# Patient Record
Sex: MALE | Race: WHITE | NOT HISPANIC OR LATINO | Employment: UNEMPLOYED | URBAN - METROPOLITAN AREA
[De-identification: names, ages, dates, MRNs, and addresses within clinical notes are randomized per-mention and may not be internally consistent; named-entity substitution may affect disease eponyms.]

---

## 2017-12-20 ENCOUNTER — HOSPITAL ENCOUNTER (OUTPATIENT)
Dept: OTHER | Facility: HOSPITAL | Age: 37
Setting detail: SPECIMEN
Discharge: HOME | End: 2017-12-20
Attending: SURGERY | Admitting: SURGERY

## 2022-06-02 ENCOUNTER — HOSPITAL ENCOUNTER (EMERGENCY)
Facility: HOSPITAL | Age: 42
Discharge: LEFT BEFORE DISCHARGE | End: 2022-06-02

## 2022-06-02 ENCOUNTER — APPOINTMENT (OUTPATIENT)
Dept: GENERAL RADIOLOGY | Facility: HOSPITAL | Age: 42
End: 2022-06-02

## 2022-06-02 VITALS
TEMPERATURE: 97.6 F | SYSTOLIC BLOOD PRESSURE: 154 MMHG | OXYGEN SATURATION: 99 % | BODY MASS INDEX: 25.67 KG/M2 | DIASTOLIC BLOOD PRESSURE: 115 MMHG | HEART RATE: 84 BPM | RESPIRATION RATE: 19 BRPM | WEIGHT: 200 LBS | HEIGHT: 74 IN

## 2022-06-02 LAB
ALBUMIN SERPL-MCNC: 4.4 G/DL (ref 3.5–5.2)
ALBUMIN/GLOB SERPL: 1.6 G/DL
ALP SERPL-CCNC: 62 U/L (ref 39–117)
ALT SERPL W P-5'-P-CCNC: 15 U/L (ref 1–41)
ANION GAP SERPL CALCULATED.3IONS-SCNC: 12 MMOL/L (ref 5–15)
AST SERPL-CCNC: 28 U/L (ref 1–40)
BASOPHILS # BLD AUTO: 0.09 10*3/MM3 (ref 0–0.2)
BASOPHILS NFR BLD AUTO: 0.9 % (ref 0–1.5)
BILIRUB SERPL-MCNC: 0.3 MG/DL (ref 0–1.2)
BUN SERPL-MCNC: 9 MG/DL (ref 6–20)
BUN/CREAT SERPL: 8.8 (ref 7–25)
CALCIUM SPEC-SCNC: 9.5 MG/DL (ref 8.6–10.5)
CHLORIDE SERPL-SCNC: 102 MMOL/L (ref 98–107)
CO2 SERPL-SCNC: 26 MMOL/L (ref 22–29)
CREAT SERPL-MCNC: 1.02 MG/DL (ref 0.76–1.27)
DEPRECATED RDW RBC AUTO: 42.5 FL (ref 37–54)
EGFRCR SERPLBLD CKD-EPI 2021: 94.1 ML/MIN/1.73
EOSINOPHIL # BLD AUTO: 0.33 10*3/MM3 (ref 0–0.4)
EOSINOPHIL NFR BLD AUTO: 3.2 % (ref 0.3–6.2)
ERYTHROCYTE [DISTWIDTH] IN BLOOD BY AUTOMATED COUNT: 13.4 % (ref 12.3–15.4)
GLOBULIN UR ELPH-MCNC: 2.7 GM/DL
GLUCOSE SERPL-MCNC: 100 MG/DL (ref 65–99)
HCT VFR BLD AUTO: 48.5 % (ref 37.5–51)
HGB BLD-MCNC: 16.1 G/DL (ref 13–17.7)
HOLD SPECIMEN: NORMAL
IMM GRANULOCYTES # BLD AUTO: 0.04 10*3/MM3 (ref 0–0.05)
IMM GRANULOCYTES NFR BLD AUTO: 0.4 % (ref 0–0.5)
LIPASE SERPL-CCNC: 39 U/L (ref 13–60)
LYMPHOCYTES # BLD AUTO: 3.99 10*3/MM3 (ref 0.7–3.1)
LYMPHOCYTES NFR BLD AUTO: 39.2 % (ref 19.6–45.3)
MCH RBC QN AUTO: 28.8 PG (ref 26.6–33)
MCHC RBC AUTO-ENTMCNC: 33.2 G/DL (ref 31.5–35.7)
MCV RBC AUTO: 86.8 FL (ref 79–97)
MONOCYTES # BLD AUTO: 0.67 10*3/MM3 (ref 0.1–0.9)
MONOCYTES NFR BLD AUTO: 6.6 % (ref 5–12)
NEUTROPHILS NFR BLD AUTO: 49.7 % (ref 42.7–76)
NEUTROPHILS NFR BLD AUTO: 5.06 10*3/MM3 (ref 1.7–7)
NRBC BLD AUTO-RTO: 0 /100 WBC (ref 0–0.2)
NT-PROBNP SERPL-MCNC: 24.4 PG/ML (ref 0–450)
PLATELET # BLD AUTO: 245 10*3/MM3 (ref 140–450)
PMV BLD AUTO: 10.5 FL (ref 6–12)
POTASSIUM SERPL-SCNC: 4 MMOL/L (ref 3.5–5.2)
PROT SERPL-MCNC: 7.1 G/DL (ref 6–8.5)
RBC # BLD AUTO: 5.59 10*6/MM3 (ref 4.14–5.8)
SODIUM SERPL-SCNC: 140 MMOL/L (ref 136–145)
TROPONIN T SERPL-MCNC: <0.01 NG/ML (ref 0–0.03)
WBC NRBC COR # BLD: 10.18 10*3/MM3 (ref 3.4–10.8)
WHOLE BLOOD HOLD COAG: NORMAL
WHOLE BLOOD HOLD SPECIMEN: NORMAL

## 2022-06-02 PROCEDURE — 84484 ASSAY OF TROPONIN QUANT: CPT

## 2022-06-02 PROCEDURE — 80053 COMPREHEN METABOLIC PANEL: CPT

## 2022-06-02 PROCEDURE — 83690 ASSAY OF LIPASE: CPT

## 2022-06-02 PROCEDURE — 83880 ASSAY OF NATRIURETIC PEPTIDE: CPT

## 2022-06-02 PROCEDURE — 99211 OFF/OP EST MAY X REQ PHY/QHP: CPT

## 2022-06-02 PROCEDURE — 93005 ELECTROCARDIOGRAM TRACING: CPT

## 2022-06-02 PROCEDURE — 85025 COMPLETE CBC W/AUTO DIFF WBC: CPT

## 2022-06-02 PROCEDURE — 71045 X-RAY EXAM CHEST 1 VIEW: CPT

## 2022-06-02 RX ORDER — ASPIRIN 81 MG/1
324 TABLET, CHEWABLE ORAL ONCE
Status: DISCONTINUED | OUTPATIENT
Start: 2022-06-02 | End: 2022-06-02 | Stop reason: HOSPADM

## 2022-06-02 RX ORDER — SODIUM CHLORIDE 0.9 % (FLUSH) 0.9 %
10 SYRINGE (ML) INJECTION AS NEEDED
Status: DISCONTINUED | OUTPATIENT
Start: 2022-06-02 | End: 2022-06-02 | Stop reason: HOSPADM

## 2023-10-02 PROCEDURE — 99283 EMERGENCY DEPT VISIT LOW MDM: CPT

## 2023-10-03 ENCOUNTER — HOSPITAL ENCOUNTER (EMERGENCY)
Facility: HOSPITAL | Age: 43
Discharge: HOME | End: 2023-10-03
Attending: EMERGENCY MEDICINE | Admitting: EMERGENCY MEDICINE
Payer: COMMERCIAL

## 2023-10-03 VITALS
DIASTOLIC BLOOD PRESSURE: 77 MMHG | TEMPERATURE: 97.9 F | HEART RATE: 70 BPM | RESPIRATION RATE: 18 BRPM | HEIGHT: 74 IN | SYSTOLIC BLOOD PRESSURE: 109 MMHG | BODY MASS INDEX: 28.23 KG/M2 | OXYGEN SATURATION: 94 % | WEIGHT: 220 LBS

## 2023-10-03 DIAGNOSIS — Z76.89 ENCOUNTER FOR INCISION AND DRAINAGE PROCEDURE: ICD-10-CM

## 2023-10-03 DIAGNOSIS — L02.31 ABSCESS OF RIGHT BUTTOCK: Primary | ICD-10-CM

## 2023-10-03 PROCEDURE — 87205 SMEAR GRAM STAIN: CPT | Performed by: EMERGENCY MEDICINE

## 2023-10-03 PROCEDURE — 87070 CULTURE OTHR SPECIMN AEROBIC: CPT | Performed by: EMERGENCY MEDICINE

## 2023-10-03 PROCEDURE — 87147 CULTURE TYPE IMMUNOLOGIC: CPT | Performed by: EMERGENCY MEDICINE

## 2023-10-03 PROCEDURE — 87186 SC STD MICRODIL/AGAR DIL: CPT | Performed by: EMERGENCY MEDICINE

## 2023-10-03 RX ORDER — LIDOCAINE HYDROCHLORIDE 10 MG/ML
10 INJECTION, SOLUTION EPIDURAL; INFILTRATION; INTRACAUDAL; PERINEURAL ONCE
Status: COMPLETED | OUTPATIENT
Start: 2023-10-03 | End: 2023-10-03

## 2023-10-03 RX ORDER — OXYCODONE HYDROCHLORIDE AND ACETAMINOPHEN 5; 325 MG/1; MG/1
1 TABLET ORAL EVERY 4 HOURS PRN
Qty: 10 TABLET | Refills: 0 | Status: SHIPPED | OUTPATIENT
Start: 2023-10-03

## 2023-10-03 RX ORDER — SULFAMETHOXAZOLE AND TRIMETHOPRIM 800; 160 MG/1; MG/1
1 TABLET ORAL 2 TIMES DAILY
Qty: 14 TABLET | Refills: 0 | Status: SHIPPED | OUTPATIENT
Start: 2023-10-03

## 2023-10-03 RX ORDER — OXYCODONE HYDROCHLORIDE AND ACETAMINOPHEN 5; 325 MG/1; MG/1
1 TABLET ORAL ONCE
Status: COMPLETED | OUTPATIENT
Start: 2023-10-03 | End: 2023-10-03

## 2023-10-03 RX ORDER — SULFAMETHOXAZOLE AND TRIMETHOPRIM 800; 160 MG/1; MG/1
1 TABLET ORAL ONCE
Status: COMPLETED | OUTPATIENT
Start: 2023-10-03 | End: 2023-10-03

## 2023-10-03 RX ORDER — CEPHALEXIN 500 MG/1
500 CAPSULE ORAL 2 TIMES DAILY
Qty: 14 CAPSULE | Refills: 0 | Status: SHIPPED | OUTPATIENT
Start: 2023-10-03

## 2023-10-03 RX ORDER — ALBUTEROL SULFATE 90 UG/1
2 AEROSOL, METERED RESPIRATORY (INHALATION) EVERY 4 HOURS PRN
COMMUNITY

## 2023-10-03 RX ORDER — CEPHALEXIN 250 MG/1
500 CAPSULE ORAL ONCE
Status: COMPLETED | OUTPATIENT
Start: 2023-10-03 | End: 2023-10-03

## 2023-10-03 RX ADMIN — OXYCODONE HYDROCHLORIDE AND ACETAMINOPHEN 1 TABLET: 5; 325 TABLET ORAL at 03:54

## 2023-10-03 RX ADMIN — CEPHALEXIN 500 MG: 250 CAPSULE ORAL at 03:54

## 2023-10-03 RX ADMIN — SULFAMETHOXAZOLE AND TRIMETHOPRIM 1 TABLET: 800; 160 TABLET ORAL at 03:54

## 2023-10-03 RX ADMIN — LIDOCAINE HYDROCHLORIDE 10 ML: 10 INJECTION, SOLUTION EPIDURAL; INFILTRATION; INTRACAUDAL; PERINEURAL at 03:55

## 2023-10-03 NOTE — Clinical Note
[facility] EMERGENCY DEPARTMENT  [address]  [facility] [address]  Phone: [phone]    aBlbir Soares was seen and treated in our emergency department on 10/2/2023.  He may return to work on 10/05/2023.         Thank you for choosing [facility].    Franny Ibrahim RN

## 2023-10-03 NOTE — Clinical Note
[facility] EMERGENCY DEPARTMENT  [address]  [facility] [address]  Phone: [phone]    Balbir Soares was seen and treated in our emergency department on 10/2/2023.  He may return to school on 10/03/2023.          Thank you for choosing [facility].    Franny Ibrahim RN

## 2023-10-03 NOTE — Clinical Note
[facility] EMERGENCY DEPARTMENT  [address]  [facility] [address]  Phone: [phone]    Balbir Soares was seen and treated in our emergency department on 10/2/2023.  He may return to work on 10/05/2023.         Thank you for choosing [facility].    Franny Ibrahim RN

## 2023-10-03 NOTE — DISCHARGE INSTRUCTIONS
Patient had progressive worsening of abscess to the right buttocks.  We reperformed incision and drainage and were able to expel large amount of purulent drainage.  Aerobic wound culture is in process.  We applied significant amount of packing and marked borders of redness.  Return to the ER for wound recheck and repacking in 48 hours.  Rx for Bactrim and Keflex twice daily x7 days and we wrote short course of Percocet 5 mg by mouth every 4-6 hours as needed for moderate pain dispense 10 no refills.  Patient may also alternate ibuprofen every 6 hours as needed for breakthrough pain.

## 2023-10-03 NOTE — ED PROVIDER NOTES
Subjective   History of Present Illness  This is a 43-year-old male that presents the ER with abscess to the right buttocks that has been progressively worsening x2 weeks.  Patient reports throbbing with increased pressure.  He said that redness surrounding the abscess has enlarged.  Patient was seen at [facility] on Thursday, 9/28/2023 and he had incision and drainage with packing placed.  The packing fell out quickly after he got home.  He was prescribed 1 antibiotic that he has been taking twice daily.  He denies known history of MRSA.  He denies personal history of diabetes.  He denies history of previous abscess.  No skin injury and patient denies drug use.  Patient reports chills but denies any fever or nausea/vomiting or malaise/fatigue or body aches.  No other concerns at this time.    History provided by:  Patient  Abscess  Location:  Pelvis  Pelvic abscess location:  R buttock  Size:  3cm  Abscess quality: induration, painful and redness    Red streaking: no    Duration:  2 weeks  Progression:  Worsening  Pain details:     Quality:  Throbbing and pressure    Timing:  Constant    Progression:  Worsening  Chronicity:  New  Context: not diabetes, not injected drug use and not skin injury    Relieved by:  Nothing  Worsened by:  Nothing  Ineffective treatments: I&D at [facility] 4 days ago and oral abx per [facility].  Pt has been on abx x 4 days.  Associated symptoms: no anorexia, no fatigue, no fever, no headaches, no nausea and no vomiting    Risk factors: no hx of MRSA and no prior abscess      Review of Systems   Constitutional: Negative.  Positive for chills. Negative for activity change, appetite change, fatigue and fever.   Respiratory: Negative.     Cardiovascular: Negative.    Gastrointestinal: Negative.  Negative for anorexia, nausea and vomiting.   Genitourinary: Negative.    Musculoskeletal: Negative.    Skin:  Positive for color change (erythema around abscess to right buttocks.) and wound (abscess to right  buttocks.).   Neurological: Negative.  Negative for dizziness and headaches.   All other systems reviewed and are negative.    Past Medical History:   Diagnosis Date    Asthma        Allergies   Allergen Reactions    Asa [Aspirin] Hives    Codeine Hives    Penicillins Hives       Past Surgical History:   Procedure Laterality Date    TUMOR EXCISION         History reviewed. No pertinent family history.    Social History     Socioeconomic History    Marital status:            Objective   Physical Exam  Vitals and nursing note reviewed.   Constitutional:       Appearance: Normal appearance.   HENT:      Head: Normocephalic and atraumatic.      Nose: Nose normal.      Mouth/Throat:      Mouth: Mucous membranes are moist.      Pharynx: Oropharynx is clear.   Eyes:      Extraocular Movements: Extraocular movements intact.      Conjunctiva/sclera: Conjunctivae normal.      Pupils: Pupils are equal, round, and reactive to light.   Cardiovascular:      Rate and Rhythm: Normal rate and regular rhythm.      Pulses: Normal pulses.      Heart sounds: Normal heart sounds.   Pulmonary:      Effort: Pulmonary effort is normal.      Breath sounds: Normal breath sounds.   Abdominal:      General: Bowel sounds are normal.      Palpations: Abdomen is soft.   Musculoskeletal:         General: Normal range of motion.      Cervical back: Normal range of motion and neck supple.   Skin:     General: Skin is warm and dry.      Findings: Erythema and wound present.             Comments: Central abscess to the right buttocks with purulent yellow drainage noted in the opening and surrounding 3 cm area of induration with at least 5 to 6 cm of confluent erythema to the buttocks.   Neurological:      General: No focal deficit present.      Mental Status: He is alert.       Incision & Drainage    Date/Time: 10/3/2023 3:53 AM  Performed by: Lori Gonzalez PA-C  Authorized by: Gonzalo Espinal MD     Consent:     Consent obtained:  Verbal     "Consent given by:  Patient    Risks, benefits, and alternatives were discussed: yes      Risks discussed:  Bleeding, incomplete drainage and infection  Universal protocol:     Patient identity confirmed:  Verbally with patient  Location:     Type:  Abscess    Size:  3cm    Location: Right buttocks.  Pre-procedure details:     Skin preparation:  Povidone-iodine  Sedation:     Sedation type:  None  Anesthesia:     Anesthesia method:  Local infiltration    Local anesthetic:  Lidocaine 1% w/o epi  Procedure type:     Complexity:  Complex  Procedure details:     Incision types:  Single straight    Incision depth:  Subcutaneous    Wound management:  Probed and deloculated    Drainage:  Purulent (There was also chunky, cheesy material as well as purulent yellow drainage)    Drainage amount:  Copious    Wound treatment:  Drain placed    Packing materials:  1/4 in gauze    Amount 1/4\":  6  Post-procedure details:     Procedure completion:  Tolerated well, no immediate complications           ED Course  ED Course as of 10/03/23 0359   Tue Oct 03, 2023   0354 Patient is afebrile and all other vital signs are stable.  He has abscess with central opening and recent I&D 4 days ago.  He is having progression of confluent erythema as well as 3 cm area of induration.  See procedure note for I&D details.  We were able to express a copious amount of purulent yellow drainage and aerobic wound cultures were obtained.  Patient also had some chunky cheesy core material expelled.  We applied approximately 6 inches of quarter inch sterile packing and then I applied Covaderm.  We will prescribe Bactrim and Keflex twice daily x7 days and we will give short course of Percocet 5 mg by mouth every 4-6 hours as needed for moderate pain dispense 10 no refills.  We marked the borders of erythema and recommend patient to return to the ER in 48 hours for wound recheck and repacking. [FC]      ED Course User Index  [FC] Lori Gonzalez PA-C         "   No results found for this or any previous visit (from the past 24 hour(s)).  Note: In addition to lab results from this visit, the labs listed above may include labs taken at another facility or during a different encounter within the last 24 hours. Please correlate lab times with ED admission and discharge times for further clarification of the services performed during this visit.    No orders to display     Vitals:    10/03/23 0316 10/03/23 0334 10/03/23 0344 10/03/23 0349   BP:       BP Location:       Patient Position:       Pulse: 72 71 64 70   Resp:       Temp:       TempSrc:       SpO2: 96% 96% 94% 94%   Weight:       Height:         Medications   lidocaine PF 1% (XYLOCAINE) injection 10 mL (10 mL Infiltration Given 10/3/23 0355)   oxyCODONE-acetaminophen (PERCOCET) 5-325 MG per tablet 1 tablet (1 tablet Oral Given 10/3/23 0354)   sulfamethoxazole-trimethoprim (BACTRIM DS,SEPTRA DS) 800-160 MG per tablet 1 tablet (1 tablet Oral Given 10/3/23 0354)   cephalexin (KEFLEX) capsule 500 mg (500 mg Oral Given 10/3/23 0354)     ECG/EMG Results (last 24 hours)       ** No results found for the last 24 hours. **          No orders to display     IWONA query complete. Treatment plan to include limited course of prescribed  controlled substance. Risks including addiction, benefits, and alternatives presented to patient.                                Medical Decision Making  Problems Addressed:  Abscess of right buttock: complicated acute illness or injury  Encounter for incision and drainage procedure: complicated acute illness or injury    Amount and/or Complexity of Data Reviewed  Labs: ordered.    Risk  Prescription drug management.        Final diagnoses:   Abscess of right buttock   Encounter for incision and drainage procedure       ED Disposition  ED Disposition       ED Disposition   Discharge    Condition   Stable    Comment   --               [facility] EMERGENCY DEPARTMENT  [address]  [facility] [id number] [phone]  In 2 days  Return to ER in 2 days for wound recheck and repacking         Medication List        New Prescriptions      cephalexin 500 MG capsule  Commonly known as: KEFLEX  Take 1 capsule by mouth 2 (Two) Times a Day.     oxyCODONE-acetaminophen 5-325 MG per tablet  Commonly known as: PERCOCET  Take 1 tablet by mouth Every 4 (Four) Hours As Needed for Moderate Pain.     sulfamethoxazole-trimethoprim 800-160 MG per tablet  Commonly known as: BACTRIM DS,SEPTRA DS  Take 1 tablet by mouth 2 (Two) Times a Day.               Where to Get Your Medications        These medications were sent to [id number] - [place], [place] - [address] AT [facility] OF [address] - [phone]  - [phone] FX  [address] [facility] [address]      Phone: [phone]   cephalexin 500 MG capsule  oxyCODONE-acetaminophen 5-325 MG per tablet  sulfamethoxazole-trimethoprim 800-160 MG per tablet            Lori Gonzalez PA-C  10/03/23 0357       Lori Gonzalez PA-C  10/03/23 9050

## 2023-10-04 ENCOUNTER — TELEPHONE (OUTPATIENT)
Dept: EMERGENCY DEPT | Facility: HOSPITAL | Age: 43
End: 2023-10-04
Payer: COMMERCIAL

## 2023-10-05 ENCOUNTER — HOSPITAL ENCOUNTER (EMERGENCY)
Facility: HOSPITAL | Age: 43
Discharge: HOME | End: 2023-10-05
Attending: EMERGENCY MEDICINE
Payer: COMMERCIAL

## 2023-10-05 VITALS
BODY MASS INDEX: 28.23 KG/M2 | HEART RATE: 83 BPM | DIASTOLIC BLOOD PRESSURE: 93 MMHG | OXYGEN SATURATION: 96 % | TEMPERATURE: 98 F | RESPIRATION RATE: 16 BRPM | HEIGHT: 74 IN | SYSTOLIC BLOOD PRESSURE: 126 MMHG | WEIGHT: 220 LBS

## 2023-10-05 DIAGNOSIS — Z48.00 ENCOUNTER FOR REMOVAL OF ABSCESS PACKING: Primary | ICD-10-CM

## 2023-10-05 LAB
BACTERIA SPEC AEROBE CULT: ABNORMAL
GRAM STN SPEC: ABNORMAL

## 2023-10-05 PROCEDURE — 99283 EMERGENCY DEPT VISIT LOW MDM: CPT

## 2023-10-05 RX ORDER — IBUPROFEN 800 MG/1
800 TABLET ORAL
Qty: 15 TABLET | Refills: 0 | Status: SHIPPED | OUTPATIENT
Start: 2023-10-05

## 2023-10-05 NOTE — ED PROVIDER NOTES
EMERGENCY DEPARTMENT ENCOUNTER    Pt Name: Balbir Soares  MRN: [id number]  Pt :   1980  Room Number:  26SF/26  Date of encounter:  10/5/2023  PCP: Joy Wade MD  ED Provider: GRIFFIN Conrad    Historian: Patient    HPI:  Chief Complaint: Right buttock abscess wound check    Context: Balbir Soares is a 43 y.o. male who presents to the ED c/o right buttock wound check.  Patient had a abscess I&D 2 days ago.  Patient is here for packing removal.  Patient is currently taking Bactrim and Keflex.  Patient reports that the area has continued to drain however it is improving.  Denies any fevers or chills.  HPI     REVIEW OF SYSTEMS  A chief complaint appropriate review of systems was completed and is negative except as noted in the HPI.     PAST MEDICAL HISTORY  Past Medical History:   Diagnosis Date    Asthma        PAST SURGICAL HISTORY  Past Surgical History:   Procedure Laterality Date    TUMOR EXCISION         FAMILY HISTORY  No family history on file.    SOCIAL HISTORY  Social History     Socioeconomic History    Marital status:        ALLERGIES  Asa [aspirin], Codeine, and Penicillins    PHYSICAL EXAM  Physical Exam  Vitals and nursing note reviewed.   Constitutional:       General: He is not in acute distress.     Appearance: Normal appearance. He is not ill-appearing.   HENT:      Head: Normocephalic and atraumatic.      Nose: Nose normal.      Mouth/Throat:      Mouth: Mucous membranes are moist.   Eyes:      Extraocular Movements: Extraocular movements intact.      Pupils: Pupils are equal, round, and reactive to light.   Cardiovascular:      Rate and Rhythm: Normal rate and regular rhythm.      Pulses: Normal pulses.   Pulmonary:      Effort: Pulmonary effort is normal.      Breath sounds: Normal breath sounds.   Abdominal:      General: Bowel sounds are normal.   Musculoskeletal:         General: Normal range of motion.      Cervical back: Normal range of motion.    Skin:     General: Skin is warm and dry.      Comments: Right buttock abscess evaluated.  Packing was removed today while in the ED.  Wound continues to drain this purulent drainage.  Site was cleaned with normal saline.  Wound was covered with 4 x 4 and a bandage.     Neurological:      Mental Status: He is alert and oriented to person, place, and time.   Psychiatric:         Behavior: Behavior normal.         LAB RESULTS  Results for orders placed or performed during the hospital encounter of 10/03/23   Wound Culture - Drainage, Buttock, Right    Specimen: Buttock, Right; Drainage   Result Value Ref Range    Wound Culture Light growth (2+) Staphylococcus aureus, MRSA (A)     Gram Stain Moderate (3+) WBCs per low power field     Gram Stain Many (4+) Red blood cells     Gram Stain Few (2+) Gram positive cocci in clusters        Susceptibility    Staphylococcus aureus, MRSA - TOBIAS*     Clindamycin  Susceptible ug/ml     Inducible Clindamycin Resistance  Negative ug/ml     Erythromycin  Resistant ug/ml     Oxacillin  Resistant ug/ml     Rifampin  Susceptible ug/ml     Tetracycline  Susceptible ug/ml     Trimethoprim + Sulfamethoxazole  Susceptible ug/ml     Vancomycin  Susceptible ug/ml     * This isolate does not demonstrate inducible clindamycin resistance in vitro.         If labs were ordered, I independently reviewed the results and considered them in treating the patient.    RADIOLOGY  No orders to display     [] Radiologist's Report Reviewed:  I ordered and independently reviewed the above noted radiographic studies.  See radiologist's dictation for official interpretation.      PROCEDURES    Procedures    No orders to display       MEDICATIONS GIVEN IN ER    Medications - No data to display    MEDICAL DECISION MAKING, PROGRESS, and CONSULTS   Medical Decision Making  Balbir Soares is a 43 y.o. male who presents to the ED c/o right buttock wound check.  Patient had a abscess I&D 2 days ago.  Patient is here  for packing removal.  Patient is currently taking Bactrim and Keflex.  Patient reports that the area has continued to drain however it is improving.  Denies any fevers or chills.    Problems Addressed:  Encounter for removal of abscess packing: complicated acute illness or injury     Details: Packing removal.  Patient tolerated well.  Wound was cleaned and dressed.    Risk  Prescription drug management.        All labs have been independently reviewed by me.  All radiology studies have been reviewed by me and the radiologist dictating the report.  All EKG's have been independently viewed by me.    [] Discussed with radiology regarding test interpretation:    Discussion below represents my analysis of pertinent findings related to patient's condition, differential diagnosis, treatment plan and final disposition.    Differential diagnosis:  The differential diagnosis associated with the patient's presentation includes: Abscess, cellulitis, packing removal    Additional sources  Discussed/ obtained information from independent historians:   [] Spouse  [] Parent  [] Family member  [] Friend  [] EMS   [] Other:  External (non-ED) record review:   [] Inpatient record:   [] Office record:   [] Outpatient record:   [] Prior Outpatient labs:   [] Prior Outpatient radiology:   [] Primary Care record:   [] Outside ED record:   [] Other:   Patient's care impacted by:   [] Diabetes  [] Hypertension  [] Hyperlipidemia  [] Hypothyroidism   [] Coronary Artery Disease   [] COPD   [] Cancer   [] Obesity  [] GERD   [] Tobacco Abuse   [] Substance Abuse    [] Anxiety   [] Depression   [] Other:   Care significantly affected by Social Determinants of Health (housing and economic circumstances, unemployment)    [] Yes     [x] No   If yes, Patient's care significantly limited by  Social Determinants of Health including:   [] Inadequate housing   [] Low income   [] Alcoholism and drug addiction in family   [] Problems related to primary  support group   [] Unemployment   [] Problems related to employment   [] Other Social Determinants of Health:     Shared decision making: Wound packing was removed while in the ED.  Patient tolerated well.  Site was clean with normal saline.  Dressing was reapplied.    Orders placed during this visit:  No orders of the defined types were placed in this encounter.      I considered prescription management  with:   [] Pain medication  [] Antiviral  [] Antibiotic   [] Other:   Rationale:  Additional orders considered but not ordered:  The following testing was considered but ultimately not selected after discussion with patient/family:    ED Course:              DIAGNOSIS  Final diagnoses:   Encounter for removal of abscess packing       DISPOSITION    DISCHARGE    Patient discharged in stable condition.    Reviewed implications of results, diagnosis, meds, responsibility to follow up, warning signs and symptoms of possible worsening, potential complications and reasons to return to ER.    Patient/Family voiced understanding of above instructions.    Discussed plan for discharge, as there is no emergent indication for admission.  Pt/family is agreeable and understands need for follow up and possible repeat testing.  Pt/family is aware that discharge does not mean that nothing is wrong but that it indicates no emergency is currently present that requires admission and they must continue care with follow-up as given below or with a physician of their choice.     FOLLOW-UP  Joy Wade MD  [address] [id number] [phone]               Medication List        New Prescriptions      ibuprofen 800 MG tablet  Commonly known as: ADVIL,MOTRIN  Take 1 tablet by mouth 3 (Three) Times a Day With Meals.               Where to Get Your Medications        These medications were sent to [id number] - [place], [place] - [address] AT [facility] [address] -  [phone]  - [phone] FX  [address], [facility] [address]      Phone: [phone]   ibuprofen 800 MG tablet          ED Disposition       ED Disposition   Discharge    Condition   Stable    Comment   --               Please note that portions of this document were completed with voice recognition software.       Leann Wright, APRN  10/05/23 9980

## 2023-10-06 NOTE — DISCHARGE INSTRUCTIONS
Keep wound covered.  Continue your current antibiotics.  Follow-up with primary care physician as needed.  Apply warm compresses to the site.